# Patient Record
Sex: FEMALE | Race: WHITE | Employment: FULL TIME | URBAN - METROPOLITAN AREA
[De-identification: names, ages, dates, MRNs, and addresses within clinical notes are randomized per-mention and may not be internally consistent; named-entity substitution may affect disease eponyms.]

---

## 2020-01-16 ENCOUNTER — HOSPITAL ENCOUNTER (EMERGENCY)
Age: 40
Discharge: HOME | End: 2020-01-16
Payer: COMMERCIAL

## 2020-01-16 VITALS
HEIGHT: 62 IN | HEART RATE: 94 BPM | DIASTOLIC BLOOD PRESSURE: 95 MMHG | OXYGEN SATURATION: 100 % | TEMPERATURE: 98.3 F | RESPIRATION RATE: 16 BRPM | BODY MASS INDEX: 23.55 KG/M2 | SYSTOLIC BLOOD PRESSURE: 169 MMHG | WEIGHT: 128 LBS

## 2020-01-16 PROCEDURE — 99283 EMERGENCY DEPT VISIT LOW MDM: CPT

## 2020-01-16 RX ORDER — OSELTAMIVIR PHOSPHATE 75 MG/1
75 CAPSULE ORAL 2 TIMES DAILY
Qty: 10 CAPSULE | Refills: 0 | Status: SHIPPED | OUTPATIENT
Start: 2020-01-16 | End: 2020-01-21

## 2020-01-16 RX ORDER — ONDANSETRON 4 MG/1
4 TABLET, ORALLY DISINTEGRATING ORAL EVERY 8 HOURS PRN
Qty: 10 TABLET | Refills: 0 | Status: SHIPPED | OUTPATIENT
Start: 2020-01-16 | End: 2021-12-22

## 2020-01-16 ASSESSMENT — PAIN DESCRIPTION - LOCATION: LOCATION: HEAD

## 2020-01-16 ASSESSMENT — PAIN DESCRIPTION - PAIN TYPE: TYPE: ACUTE PAIN

## 2020-01-16 ASSESSMENT — PAIN SCALES - GENERAL: PAINLEVEL_OUTOF10: 3

## 2020-01-17 NOTE — ED PROVIDER NOTES
EMERGENCY DEPARTMENT ENCOUNTER      PCP: No primary care provider on file. CHIEF COMPLAINT    Chief Complaint   Patient presents with    Generalized Body Aches    Nasal Congestion       HPI    Mainor Kirkland is a 44 y.o. female who presents with nasal congestion, body aches, subjective fevers. Onset was last night. Context is patient's son has influenza and was diagnosed 6 days ago. Duration has been constant since the onset. Patient reports associated nausea but denies emesis. Patient denies sore throat, ear pain, difficulty swallowing, shortness of breath, wheezing, hemoptysis. REVIEW OF SYSTEMS    Constitutional:  Denies fever, chills  HEENT:  See HPI  Cardiovascular:  Denies chest pain, palpitations. Respiratory:  See HPI  GI:  + nausea; Denies abdominal pain, vomiting, or diarrhea   Neurologic:  Denies confusion, light headedness, dizziness, or syncope   Skin:  No rash    All other review of systems are negative  See HPI and nursing notes for additional information       PAST MEDICAL AND SURGICAL HISTORY    History reviewed. No pertinent past medical history. History reviewed. No pertinent surgical history. CURRENT MEDICATIONS    Current Outpatient Rx   Medication Sig Dispense Refill    ondansetron (ZOFRAN ODT) 4 MG disintegrating tablet Take 1 tablet by mouth every 8 hours as needed for Nausea or Vomiting 10 tablet 0    oseltamivir (TAMIFLU) 75 MG capsule Take 1 capsule by mouth 2 times daily for 5 days 10 capsule 0       ALLERGIES    Allergies   Allergen Reactions    Sulfa Antibiotics        FAMILY AND SOCIAL HISTORY    History reviewed. No pertinent family history.   Social History     Socioeconomic History    Marital status: Single     Spouse name: None    Number of children: None    Years of education: None    Highest education level: None   Occupational History    None   Social Needs    Financial resource strain: None    Food insecurity:     Worry: None     Inability: None  Transportation needs:     Medical: None     Non-medical: None   Tobacco Use    Smoking status: Current Every Day Smoker   Substance and Sexual Activity    Alcohol use: Yes    Drug use: None    Sexual activity: None   Lifestyle    Physical activity:     Days per week: None     Minutes per session: None    Stress: None   Relationships    Social connections:     Talks on phone: None     Gets together: None     Attends [employer] service: None     Active member of club or organization: None     Attends meetings of clubs or organizations: None     Relationship status: None    Intimate partner violence:     Fear of current or ex partner: None     Emotionally abused: None     Physically abused: None     Forced sexual activity: None   Other Topics Concern    None   Social History Narrative    None       PHYSICAL EXAM    VITAL SIGNS: BP (!) 169/95   Pulse 94   Temp 98.3 °F (36.8 °C) (Oral)   Resp 16   Ht 5' 2\" (1.575 m)   Wt 128 lb (58.1 kg)   SpO2 100%   BMI 23.41 kg/m²   Constitutional:  Well developed, well nourished, no acute distress, non-toxic appearance   Eyes: Conjunctiva normal, sclera non-icteric  HEENT:     - Normocephalic, atraumatic   - PERRL, EOM intact. Conjunctiva normal    -  Frontal/Maxillary sinuses NONtender to percussion.   - External auditory canals clear   - TMs clear without discharge, fluid, or bulging.   - Nasal passages with mildly erythematous and edematous turbinates. No masses. - Oropharynx mildly erythematous without tonsillar hypertrophy or exudate. Uvula is midline and rises evenly with phonation. Neck/Lymphatics:    - supple, no JVD, no swollen nodes     Respiratory:    Clear to auscultation in bilateral lung fields, no wheezes/rales/rhonchi. No retractions, no accessory muscle use  Cardiovascular:  Normal rate, normal rhythm   GI:  Soft, no abdominal tenderness, no guarding. No rigidity. No distention. No discoloration. Normal bowel sounds.   Musculoskeletal: No obvious deficits, no edema   Integument:  Skin pink, warm, dry, intact       RADIOLOGY/PROCEDURES    No orders to display             ED COURSE & MEDICAL DECISION MAKING       Vital signs and nursing notes reviewed during ED course. I have independently evaluated this patient. Supervising physician present in the Emergency Department, available for consultation, throughout entirety of  patient care. All pertinent Lab data and radiographic results reviewed with patient at bedside. History and exam are consistent with URI, nausea, body aches. 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 I recommend symptomatic treatment with increase fluids and rest.  Patient symptoms began last night she is within the window of treatment for antiviral usage and I did offer this medication. She would like it. I prescribed patient Tamiflu and Zofran- we discussed medications. I have low clinical suspicion for pneumonia, bronchitis, meningitis. Patient is comfortable with discharge at this time. Patient is nontoxic appearing. Vital signs stable-blood pressure is elevated and therefore recommend recheck of this with PCP/walk-in clinic in 3 to 5 days. Patient is stable for outpatient management. The patient and/or the family were informed of the results of any tests/labs/imaging, the treatment plan, and time was allotted to answer questions. Diagnosis, disposition, and plan discussed in detail with patient who understands and agrees. Patient understands and agrees to follow up with walk in clinic for recheck in the next 3-5 days. Patient understands and agrees to return to emergency department if symptoms worsen or any new symptoms develop- strict return precautions given.     Vitals:    01/16/20 2041 01/16/20 2050   BP:  (!) 169/95   Pulse:  94

## 2021-12-22 ENCOUNTER — VIRTUAL VISIT (OUTPATIENT)
Dept: FAMILY MEDICINE CLINIC | Age: 41
End: 2021-12-22
Payer: COMMERCIAL

## 2021-12-22 DIAGNOSIS — U07.1 COVID-19: Primary | ICD-10-CM

## 2021-12-22 DIAGNOSIS — J01.90 ACUTE SINUSITIS, RECURRENCE NOT SPECIFIED, UNSPECIFIED LOCATION: ICD-10-CM

## 2021-12-22 PROCEDURE — 4004F PT TOBACCO SCREEN RCVD TLK: CPT | Performed by: FAMILY MEDICINE

## 2021-12-22 PROCEDURE — 99442 PR PHYS/QHP TELEPHONE EVALUATION 11-20 MIN: CPT | Performed by: FAMILY MEDICINE

## 2021-12-22 PROCEDURE — G8421 BMI NOT CALCULATED: HCPCS | Performed by: FAMILY MEDICINE

## 2021-12-22 PROCEDURE — G8427 DOCREV CUR MEDS BY ELIG CLIN: HCPCS | Performed by: FAMILY MEDICINE

## 2021-12-22 PROCEDURE — G8484 FLU IMMUNIZE NO ADMIN: HCPCS | Performed by: FAMILY MEDICINE

## 2021-12-22 RX ORDER — AZITHROMYCIN 250 MG/1
250 TABLET, FILM COATED ORAL SEE ADMIN INSTRUCTIONS
Qty: 6 TABLET | Refills: 0 | Status: SHIPPED | OUTPATIENT
Start: 2021-12-22 | End: 2021-12-27

## 2021-12-22 RX ORDER — METHYLPREDNISOLONE 4 MG/1
TABLET ORAL
Qty: 1 KIT | Refills: 0 | Status: SHIPPED | OUTPATIENT
Start: 2021-12-22 | End: 2021-12-28

## 2021-12-22 ASSESSMENT — PATIENT HEALTH QUESTIONNAIRE - PHQ9
2. FEELING DOWN, DEPRESSED OR HOPELESS: 0
SUM OF ALL RESPONSES TO PHQ QUESTIONS 1-9: 0
SUM OF ALL RESPONSES TO PHQ9 QUESTIONS 1 & 2: 0
SUM OF ALL RESPONSES TO PHQ QUESTIONS 1-9: 0
SUM OF ALL RESPONSES TO PHQ QUESTIONS 1-9: 0
1. LITTLE INTEREST OR PLEASURE IN DOING THINGS: 0

## 2021-12-23 NOTE — PROGRESS NOTES
Mala Miles is a 39 y.o. female evaluated via telephone on 12/22/2021. Consent:  She and/or health care decision maker is aware that that she may receive a bill for this telephone service, depending on her insurance coverage, and has provided verbal consent to proceed: Yes      Documentation:  I communicated with the patient and/or health care decision maker about recent diagnosis of Covid now at about day 6. She has had headaches chills congestion no productive sputum but does get slightly purulent appearing phlegm when she blows her nose. She does check room air oximetry is at home running 98 to 99%. She has been in previous good health. BMI is not elevated and she has no ongoing health issues. She is 41 she has not had any Covid shots she is not having any GI symptoms. At this point I do not believe she would qualify for monoclonal antibody therapy but because of severity of symptoms we will go ahead and treat her with a Z-Simone and a Medrol Dosepak call those into [facility] [id number] [facility]. Diagnosis today with COVID-19 infection and treatment for empiric sinusitis. We did discuss the fact that she should begin Covid shots probably in 4 to 6 weeks to supplement her immunity. She is a new patient to this practice needs follow-up visit in general within a few months. She will call with questions or problems. Details of this discussion including any medical advice provided as above. I affirm this is a Patient Initiated Episode with a Patient who has not had a related appointment within my department in the past 7 days or scheduled within the next 24 hours. Patient identification was verified at the start of the visit: Yes    Total Time: minutes: 11-20 minutes    The visit was conducted pursuant to the emergency declaration under the [facility], [facility] authority and the [employer] Act.   Patient identification was verified, and a caregiver was present when appropriate. The patient was located in a state where the provider was credentialed to provide care.     Note: not billable if this call serves to triage the patient into an appointment for the relevant concern      Ashley Meckel, MD

## 2021-12-28 ENCOUNTER — TELEPHONE (OUTPATIENT)
Dept: FAMILY MEDICINE CLINIC | Age: 41
End: 2021-12-28

## 2021-12-28 NOTE — TELEPHONE ENCOUNTER
Patient needs a letter stating that she can not get Covid vaccinated until 6 weeks after having the Covid Virus. This is for her employer. Patient had a VV on 12-22-21 and stated that is what she was told by the provider.    Call Patient when letter is ready for

## 2021-12-28 NOTE — TELEPHONE ENCOUNTER
Okay to write letter? When is patient eligible to get the booster? Spoke with the patients, positive covid swab was obtained 12/17/21.  Please advise

## 2021-12-29 NOTE — TELEPHONE ENCOUNTER
Left detailed voice message for the patient informing her the letter is ready to be picked up from the . Asked the patient to return call with any questions.

## 2022-02-14 ENCOUNTER — OFFICE VISIT (OUTPATIENT)
Dept: OBGYN | Age: 42
End: 2022-02-14
Payer: COMMERCIAL

## 2022-02-14 ENCOUNTER — HOSPITAL ENCOUNTER (OUTPATIENT)
Age: 42
Setting detail: SPECIMEN
Discharge: HOME | End: 2022-02-14
Payer: COMMERCIAL

## 2022-02-14 VITALS
WEIGHT: 131 LBS | SYSTOLIC BLOOD PRESSURE: 176 MMHG | HEIGHT: 62 IN | DIASTOLIC BLOOD PRESSURE: 117 MMHG | BODY MASS INDEX: 24.11 KG/M2

## 2022-02-14 DIAGNOSIS — Z11.51 SPECIAL SCREENING EXAMINATION FOR HUMAN PAPILLOMAVIRUS (HPV): ICD-10-CM

## 2022-02-14 DIAGNOSIS — Z12.31 SCREENING MAMMOGRAM FOR BREAST CANCER: ICD-10-CM

## 2022-02-14 DIAGNOSIS — N92.0 MENORRHAGIA WITH REGULAR CYCLE: ICD-10-CM

## 2022-02-14 DIAGNOSIS — N93.0 POSTCOITAL BLEEDING: ICD-10-CM

## 2022-02-14 DIAGNOSIS — Z01.419 ENCOUNTER FOR ANNUAL ROUTINE GYNECOLOGICAL EXAMINATION: Primary | ICD-10-CM

## 2022-02-14 LAB
BASOPHILS ABSOLUTE: 0.1 K/UL (ref 0–0.2)
BASOPHILS RELATIVE PERCENT: 1.1 %
EOSINOPHILS ABSOLUTE: 0.1 K/UL (ref 0–0.6)
EOSINOPHILS RELATIVE PERCENT: 2.3 %
HCT VFR BLD CALC: 40.3 % (ref 36–48)
HEMOGLOBIN: 13.4 G/DL (ref 12–16)
LYMPHOCYTES ABSOLUTE: 2 K/UL (ref 1–5.1)
LYMPHOCYTES RELATIVE PERCENT: 35.1 %
MCH RBC QN AUTO: 29.9 PG (ref 26–34)
MCHC RBC AUTO-ENTMCNC: 33.3 G/DL (ref 31–36)
MCV RBC AUTO: 89.9 FL (ref 80–100)
MONOCYTES ABSOLUTE: 0.5 K/UL (ref 0–1.3)
MONOCYTES RELATIVE PERCENT: 9.2 %
NEUTROPHILS ABSOLUTE: 3 K/UL (ref 1.7–7.7)
NEUTROPHILS RELATIVE PERCENT: 52.3 %
PDW BLD-RTO: 14 % (ref 12.4–15.4)
PLATELET # BLD: 263 K/UL (ref 135–450)
PMV BLD AUTO: 7.9 FL (ref 5–10.5)
RBC # BLD: 4.48 M/UL (ref 4–5.2)
WBC # BLD: 5.7 K/UL (ref 4–11)

## 2022-02-14 PROCEDURE — 36415 COLL VENOUS BLD VENIPUNCTURE: CPT | Performed by: OBSTETRICS & GYNECOLOGY

## 2022-02-14 PROCEDURE — 88142 CYTOPATH C/V THIN LAYER: CPT

## 2022-02-14 PROCEDURE — G8484 FLU IMMUNIZE NO ADMIN: HCPCS | Performed by: OBSTETRICS & GYNECOLOGY

## 2022-02-14 PROCEDURE — 87624 HPV HI-RISK TYP POOLED RSLT: CPT

## 2022-02-14 PROCEDURE — 99386 PREV VISIT NEW AGE 40-64: CPT | Performed by: OBSTETRICS & GYNECOLOGY

## 2022-02-14 PROCEDURE — 87801 DETECT AGNT MULT DNA AMPLI: CPT

## 2022-02-14 SDOH — ECONOMIC STABILITY: FOOD INSECURITY: WITHIN THE PAST 12 MONTHS, THE FOOD YOU BOUGHT JUST DIDN'T LAST AND YOU DIDN'T HAVE MONEY TO GET MORE.: NEVER TRUE

## 2022-02-14 SDOH — ECONOMIC STABILITY: FOOD INSECURITY: WITHIN THE PAST 12 MONTHS, YOU WORRIED THAT YOUR FOOD WOULD RUN OUT BEFORE YOU GOT MONEY TO BUY MORE.: NEVER TRUE

## 2022-02-14 ASSESSMENT — SOCIAL DETERMINANTS OF HEALTH (SDOH): HOW HARD IS IT FOR YOU TO PAY FOR THE VERY BASICS LIKE FOOD, HOUSING, MEDICAL CARE, AND HEATING?: NOT VERY HARD

## 2022-02-14 NOTE — PROGRESS NOTES
22    Shaun Peterson  1980    Chief Complaint   Patient presents with    Gynecologic Exam     pt here for annual, premenopausal, is sexually active, b.c-tubal ligation, LMP-22. last pap-16-normal.     Other     pt c/o postcoital bleeding x yrs, bright red, light-heavy. The patient is a 39 y.o. female, Gabriella Barragan who presents for her annual exam.  She is menstruating abnormally. She is  sexually active. She is currently taking birth control. Birth control method is sterilization. She reports additional symptoms of postcoital bleeding, prior to and after menses. .      Pap smear history: Her last PAP smear was in 2016. Her results were normal.    Breast history: she has never had a mammogram    Past Medical History:   Diagnosis Date    Abnormal Pap smear of cervix     Anemia     Anxiety     COVID-19     Irregular menses     MRSA (methicillin resistant staph aureus) culture positive     Postcoital bleeding        Past Surgical History:   Procedure Laterality Date    TUBAL LIGATION         Family History   Adopted: Yes       Social History     Tobacco Use    Smoking status: Current Every Day Smoker     Packs/day: 0.50     Years: 25.00     Pack years: 12.50    Smokeless tobacco: Never Used   Vaping Use    Vaping Use: Never used   Substance Use Topics    Alcohol use: Not Currently    Drug use: Never       No current outpatient medications on file. No current facility-administered medications for this visit. Allergies   Allergen Reactions    Sulfa Antibiotics              There is no immunization history on file for this patient. Review of Systems   Constitutional: Negative. Eyes: Negative. Respiratory: Negative. Cardiovascular: Negative. Gastrointestinal: Negative. Endocrine: Negative. Genitourinary: Negative. Musculoskeletal: Negative. Skin: Negative. Allergic/Immunologic: Negative. Neurological: Negative. Hematological: Negative. Psychiatric/Behavioral: Negative. BP (!) 176/117   Ht 5' 2\" (1.575 m)   Wt 131 lb (59.4 kg)   LMP 01/23/2022   BMI 23.96 kg/m²     Physical Exam  Exam conducted with a chaperone present. Constitutional:       Appearance: Normal appearance. HENT:      Head: Normocephalic and atraumatic. Nose: Nose normal.   Eyes:      Conjunctiva/sclera: Conjunctivae normal.   Cardiovascular:      Rate and Rhythm: Normal rate. Pulses: Normal pulses. Pulmonary:      Effort: Pulmonary effort is normal.   Chest:   Breasts:      Right: Normal. No axillary adenopathy or supraclavicular adenopathy. Left: Normal. No axillary adenopathy or supraclavicular adenopathy. Abdominal:      General: Abdomen is flat. Bowel sounds are normal.      Palpations: Abdomen is soft. Hernia: There is no hernia in the left inguinal area or right inguinal area. Genitourinary:     General: Normal vulva. Exam position: Lithotomy position. Labia:         Right: No rash, tenderness or lesion. Left: No rash, tenderness or lesion. Urethra: No prolapse. Vagina: Normal. No foreign body. No vaginal discharge, erythema, tenderness, bleeding, lesions or prolapsed vaginal walls. Cervix: No cervical motion tenderness, discharge, friability, lesion, erythema or cervical bleeding. Uterus: Normal. Not enlarged, not tender and no uterine prolapse. Adnexa: Right adnexa normal and left adnexa normal.        Right: No mass, tenderness or fullness. Left: No mass, tenderness or fullness. Musculoskeletal:      Cervical back: Normal range of motion and neck supple. Lymphadenopathy:      Upper Body:      Right upper body: No supraclavicular, axillary or pectoral adenopathy. Left upper body: No supraclavicular, axillary or pectoral adenopathy. Skin:     General: Skin is warm. Coloration: Skin is not ashen or cyanotic.       Findings: No abrasion, abscess or bruising. Rash is not crusting or macular. Neurological:      Mental Status: She is alert and oriented to person, place, and time. No results found for this visit on 02/14/22. Assessment and Plan   Diagnosis Orders   1. Encounter for annual routine gynecological examination  PAP SMEAR   2. Special screening examination for human papillomavirus (HPV)  PAP SMEAR   3. Postcoital bleeding  CBC Auto Differential    C.trachomatis N.gonorrhoeae DNA, Thin Prep    US NON OB TRANSVAGINAL    Prolactin    TSH WITH REFLEX TO FT4    Testosterone, free, total    Follicle Stimulating Hormone    HCG Qualitative, Serum   4. Menorrhagia with regular cycle  CBC Auto Differential    C.trachomatis N.gonorrhoeae DNA, Thin Prep    US NON OB TRANSVAGINAL    Prolactin    TSH WITH REFLEX TO FT4    Testosterone, free, total    Follicle Stimulating Hormone    HCG Qualitative, Serum   5. Screening mammogram for breast cancer  APPLE DIGITAL SCREEN W OR WO CAD BILATERAL       Return in about 1 year (around 2/14/2023).      Follow up after ultrasound    Shaka Jovel MD

## 2022-02-15 LAB
FOLLICLE STIMULATING HORMONE: 5.3 MIU/ML
HCG QUALITATIVE: NEGATIVE
PROLACTIN: 7.5 NG/ML
TSH REFLEX FT4: 1.47 UIU/ML (ref 0.27–4.2)

## 2022-02-15 ASSESSMENT — ENCOUNTER SYMPTOMS
EYES NEGATIVE: 1
ALLERGIC/IMMUNOLOGIC NEGATIVE: 1
RESPIRATORY NEGATIVE: 1
GASTROINTESTINAL NEGATIVE: 1

## 2022-02-16 LAB
SEX HORMONE BINDING GLOBULIN: 46 NMOL/L (ref 30–135)
TESTOSTERONE FREE-NONMALE: 4.4 PG/ML (ref 1.1–5.8)
TESTOSTERONE TOTAL: 30 NG/DL (ref 20–70)

## 2022-02-18 LAB
HPV HIGH RISK: NOT DETECTED
HPV, GENOTYPE 16: NOT DETECTED
HPV, GENOTYPE 18: NOT DETECTED

## 2022-02-22 ENCOUNTER — OFFICE VISIT (OUTPATIENT)
Dept: OBGYN | Age: 42
End: 2022-02-22
Payer: COMMERCIAL

## 2022-02-22 VITALS
BODY MASS INDEX: 23.55 KG/M2 | HEIGHT: 62 IN | SYSTOLIC BLOOD PRESSURE: 138 MMHG | HEART RATE: 99 BPM | WEIGHT: 128 LBS | DIASTOLIC BLOOD PRESSURE: 86 MMHG

## 2022-02-22 DIAGNOSIS — N93.0 POSTCOITAL BLEEDING: ICD-10-CM

## 2022-02-22 DIAGNOSIS — D25.1 FIBROIDS, INTRAMURAL: ICD-10-CM

## 2022-02-22 DIAGNOSIS — N92.1 MENOMETRORRHAGIA: Primary | ICD-10-CM

## 2022-02-22 LAB
CHLAMYDIA BY THIN PREP: NEGATIVE
GC BY THIN PREP: NEGATIVE

## 2022-02-22 PROCEDURE — G8484 FLU IMMUNIZE NO ADMIN: HCPCS | Performed by: OBSTETRICS & GYNECOLOGY

## 2022-02-22 PROCEDURE — G8427 DOCREV CUR MEDS BY ELIG CLIN: HCPCS | Performed by: OBSTETRICS & GYNECOLOGY

## 2022-02-22 PROCEDURE — 4004F PT TOBACCO SCREEN RCVD TLK: CPT | Performed by: OBSTETRICS & GYNECOLOGY

## 2022-02-22 PROCEDURE — 99213 OFFICE O/P EST LOW 20 MIN: CPT | Performed by: OBSTETRICS & GYNECOLOGY

## 2022-02-22 PROCEDURE — G8420 CALC BMI NORM PARAMETERS: HCPCS | Performed by: OBSTETRICS & GYNECOLOGY

## 2022-02-22 SDOH — ECONOMIC STABILITY: FOOD INSECURITY: WITHIN THE PAST 12 MONTHS, THE FOOD YOU BOUGHT JUST DIDN'T LAST AND YOU DIDN'T HAVE MONEY TO GET MORE.: NEVER TRUE

## 2022-02-22 SDOH — ECONOMIC STABILITY: HOUSING INSECURITY
IN THE LAST 12 MONTHS, WAS THERE A TIME WHEN YOU DID NOT HAVE A STEADY PLACE TO SLEEP OR SLEPT IN A SHELTER (INCLUDING NOW)?: NO

## 2022-02-22 SDOH — ECONOMIC STABILITY: INCOME INSECURITY: IN THE LAST 12 MONTHS, WAS THERE A TIME WHEN YOU WERE NOT ABLE TO PAY THE MORTGAGE OR RENT ON TIME?: NO

## 2022-02-22 SDOH — ECONOMIC STABILITY: TRANSPORTATION INSECURITY
IN THE PAST 12 MONTHS, HAS LACK OF TRANSPORTATION KEPT YOU FROM MEETINGS, WORK, OR FROM GETTING THINGS NEEDED FOR DAILY LIVING?: NO

## 2022-02-22 SDOH — ECONOMIC STABILITY: FOOD INSECURITY: WITHIN THE PAST 12 MONTHS, YOU WORRIED THAT YOUR FOOD WOULD RUN OUT BEFORE YOU GOT MONEY TO BUY MORE.: NEVER TRUE

## 2022-02-22 SDOH — ECONOMIC STABILITY: TRANSPORTATION INSECURITY
IN THE PAST 12 MONTHS, HAS THE LACK OF TRANSPORTATION KEPT YOU FROM MEDICAL APPOINTMENTS OR FROM GETTING MEDICATIONS?: NO

## 2022-02-22 ASSESSMENT — SOCIAL DETERMINANTS OF HEALTH (SDOH): HOW HARD IS IT FOR YOU TO PAY FOR THE VERY BASICS LIKE FOOD, HOUSING, MEDICAL CARE, AND HEATING?: NOT HARD AT ALL

## 2022-02-22 NOTE — PROGRESS NOTES
22    Connie Chaparro  1980    Chief Complaint   Patient presents with    Follow-up     had u/s and labs . ns    Other     states she has bleeding with intercourse a couple days before and after her menses q mth . suma Chaparro is a 39 y.o. female who presents today for evaluation of AUB. Past Medical History:   Diagnosis Date    Abnormal Pap smear of cervix     Anemia     Anxiety     COVID-19     Irregular menses     MRSA (methicillin resistant staph aureus) culture positive     Postcoital bleeding        Past Surgical History:   Procedure Laterality Date    TUBAL LIGATION         Social History     Tobacco Use    Smoking status: Current Every Day Smoker     Packs/day: 0.50     Years: 25.00     Pack years: 12.50    Smokeless tobacco: Never Used   Vaping Use    Vaping Use: Never used   Substance Use Topics    Alcohol use: Not Currently    Drug use: Never       Family History   Adopted: Yes       No current outpatient medications on file. No current facility-administered medications for this visit. Allergies   Allergen Reactions    Sulfa Antibiotics              There is no immunization history on file for this patient.     Review of Systems    /86   Pulse 99   Ht 5' 2\" (1.575 m)   Wt 128 lb (58.1 kg)   LMP 2022 (Exact Date)   BMI 23.41 kg/m²     Physical Exam    No results found for this visit on 22.  2022 1424 02/15/2022 0007 HCG Qualitative, Serum [[id number]]    Blood    Component Value   hCG Qual Negative           2022 1424 [id number] Follicle Stimulating Hormone [[id number]]    Blood    Component Value Units   FSH 5.3  mIU/mL           2022 1424 2022 2046 Testosterone, free, total [[id number]]    Blood    Component Value Units   Testosterone 30 ng/dL   Sex Hormone Binding 46 nmol/L   Testosterone, Free 4.4  pg/mL           2022 1424 02/15/2022 0054 TSH WITH REFLEX TO FT4 [[id number]]    Blood Component Value Units   TSH Reflex FT4 1.47 uIU/mL           02/14/2022 1424 02/15/2022 0053 Prolactin [[id number]]    Blood    Component Value Units   Prolactin 7.5  ng/mL           02/14/2022 1424 02/14/2022 2358 CBC Auto Differential [[id number]]    Blood    Component Value Units   WBC 5.7 K/uL   RBC 4.48 M/uL   Hemoglobin 13.4 g/dL   Hematocrit 40.3 %   MCV 89.9 fL   MCH 29.9 pg   MCHC 33.3 g/dL   RDW 14.0 %   Platelets 293 K/uL   MPV 7.9 fL   Neutrophils % 52.3 %   Lymphocytes % 35.1 %   Monocytes % 9.2 %   Eosinophils % 2.3 %   Basophils % 1.1 %   Neutrophils Absolute 3.0 K/uL   Lymphocytes Absolute 2.0 K/uL   Monocytes Absolute 0.5 K/uL   Eosinophils Absolute 0.1 K/uL   Basophils Absolute 0.1 K/uL           02/14/2022 1416 02/18/2022 1345 Human papillomavirus (HPV) DNA probe cervical brush high risk [[id number]]   Cervix    Component Value   HPV High Risk NOT DETECTED   HPV, Genotype 16 NOT DETECTED   HPV, Genotype 18 NOT DETECTED                ASSESSMENT AND PLAN   Diagnosis Orders   1. Menometrorrhagia     2. Postcoital bleeding     3. Fibroids, intramural       Discussed monitoring, expectant management, hysteroscopy and D&C, hormonal therapy    Return in about 4 weeks (around 3/22/2022).     Kenny Alford MD

## 2022-03-16 ENCOUNTER — TELEPHONE (OUTPATIENT)
Dept: OBGYN | Age: 42
End: 2022-03-16

## 2022-03-16 NOTE — TELEPHONE ENCOUNTER
----- Message from Holly Beauchamp MD sent at 2/22/2022 10:19 AM EST -----  Hysteroscopy and D&C, patient prefers [facility]

## 2022-03-28 ENCOUNTER — OFFICE VISIT (OUTPATIENT)
Dept: OBGYN | Age: 42
End: 2022-03-28
Payer: COMMERCIAL

## 2022-03-28 VITALS
BODY MASS INDEX: 23.55 KG/M2 | DIASTOLIC BLOOD PRESSURE: 100 MMHG | SYSTOLIC BLOOD PRESSURE: 148 MMHG | WEIGHT: 128 LBS | HEIGHT: 62 IN

## 2022-03-28 DIAGNOSIS — D25.1 FIBROIDS, INTRAMURAL: ICD-10-CM

## 2022-03-28 DIAGNOSIS — N92.1 MENOMETRORRHAGIA: Primary | ICD-10-CM

## 2022-03-28 PROCEDURE — G8484 FLU IMMUNIZE NO ADMIN: HCPCS | Performed by: OBSTETRICS & GYNECOLOGY

## 2022-03-28 PROCEDURE — G8420 CALC BMI NORM PARAMETERS: HCPCS | Performed by: OBSTETRICS & GYNECOLOGY

## 2022-03-28 PROCEDURE — 99213 OFFICE O/P EST LOW 20 MIN: CPT | Performed by: OBSTETRICS & GYNECOLOGY

## 2022-03-28 PROCEDURE — 4004F PT TOBACCO SCREEN RCVD TLK: CPT | Performed by: OBSTETRICS & GYNECOLOGY

## 2022-03-28 PROCEDURE — G8427 DOCREV CUR MEDS BY ELIG CLIN: HCPCS | Performed by: OBSTETRICS & GYNECOLOGY

## 2022-03-28 NOTE — PROGRESS NOTES
3/28/22    Sabine Saleem Melissa  1980    Chief Complaint   Patient presents with    Pre-op Exam     pt having hysteroscopy d&c on 22 due to menometrorrhagia, fibroids. Yulissa Sousa is a 39 y.o. female who presents today for evaluation of   Menometrorrhagia, fibroids  Past Medical History:   Diagnosis Date    Abnormal Pap smear of cervix     Anemia     Anxiety     COVID-19     Irregular menses     MRSA (methicillin resistant staph aureus) culture positive     Postcoital bleeding        Past Surgical History:   Procedure Laterality Date    TUBAL LIGATION         Social History     Tobacco Use    Smoking status: Current Every Day Smoker     Packs/day: 0.50     Years: 25.00     Pack years: 12.50    Smokeless tobacco: Never Used   Vaping Use    Vaping Use: Never used   Substance Use Topics    Alcohol use: Not Currently    Drug use: Never       Family History   Adopted: Yes       No current outpatient medications on file. No current facility-administered medications for this visit. Allergies   Allergen Reactions    Sulfa Antibiotics              There is no immunization history on file for this patient.     Review of Systems    BP (!) 148/100   Ht 5' 2\" (1.575 m)   Wt 128 lb (58.1 kg)   BMI 23.41 kg/m²     Physical Exam  See us 22  No results found for this visit on 22.  2022 1424 02/15/2022 0007 HCG Qualitative, Serum [[id number]]    Blood    Component Value   hCG Qual Negative           2022 1424  9245 Follicle Stimulating Hormone [[id number]]    Blood    Component Value Units   FSH 5.3  mIU/mL           2022 1424 2022 2046 Testosterone, free, total [[id number]]    Blood    Component Value Units   Testosterone 30 ng/dL   Sex Hormone Binding 46 nmol/L   Testosterone, Free 4.4  pg/mL           2022 1424 02/15/2022 0054 TSH WITH REFLEX TO FT4 [[id number]]    Blood    Component Value Units   TSH Reflex FT4 1.47 uIU/mL 02/14/2022 1424 02/15/2022 0053 Prolactin [[id number]]    Blood    Component Value Units   Prolactin 7.5  ng/mL           02/14/2022 1424 02/14/2022 2358 CBC Auto Differential [[id number]]    Blood    Component Value Units   WBC 5.7 K/uL   RBC 4.48 M/uL   Hemoglobin 13.4 g/dL   Hematocrit 40.3 %   MCV 89.9 fL   MCH 29.9 pg   MCHC 33.3 g/dL   RDW 14.0 %   Platelets 085 K/uL   MPV 7.9 fL   Neutrophils % 52.3 %   Lymphocytes % 35.1 %   Monocytes % 9.2 %   Eosinophils % 2.3 %   Basophils % 1.1 %   Neutrophils Absolute 3.0 K/uL   Lymphocytes Absolute 2.0 K/uL   Monocytes Absolute 0.5 K/uL   Eosinophils Absolute 0.1 K/uL   Basophils Absolute 0.1 K/uL           02/14/2022 1416 02/22/2022 1512 Chlamydia trachomatis & GC by amplified detection [[id number]] Component Value   Gc By Thin Prep Negative    Chlamydia By Thin Prep Negative               ASSESSMENT AND PLAN   Diagnosis Orders   1. Menometrorrhagia     2. Fibroids, intramural       Discussed options  Schedule hysteroscopy and D&C  Risks and benefits discussed  Return in about 3 weeks (around 4/18/2022).     Elizabeth Almeida MD